# Patient Record
Sex: FEMALE | Race: WHITE | NOT HISPANIC OR LATINO | Employment: FULL TIME | URBAN - METROPOLITAN AREA
[De-identification: names, ages, dates, MRNs, and addresses within clinical notes are randomized per-mention and may not be internally consistent; named-entity substitution may affect disease eponyms.]

---

## 2023-03-06 ENCOUNTER — OFFICE VISIT (OUTPATIENT)
Dept: FAMILY MEDICINE | Facility: CLINIC | Age: 23
End: 2023-03-06
Payer: COMMERCIAL

## 2023-03-06 VITALS
WEIGHT: 170 LBS | SYSTOLIC BLOOD PRESSURE: 121 MMHG | TEMPERATURE: 98.5 F | BODY MASS INDEX: 28.29 KG/M2 | HEART RATE: 76 BPM | OXYGEN SATURATION: 98 % | DIASTOLIC BLOOD PRESSURE: 74 MMHG

## 2023-03-06 DIAGNOSIS — H92.01 RIGHT EAR PAIN: Primary | ICD-10-CM

## 2023-03-06 PROCEDURE — 99203 OFFICE O/P NEW LOW 30 MIN: CPT | Performed by: FAMILY MEDICINE

## 2023-03-06 RX ORDER — VITAMIN A ACETATE, BETA CAROTENE, ASCORBIC ACID, CHOLECALCIFEROL, .ALPHA.-TOCOPHEROL ACETATE, DL-, THIAMINE MONONITRATE, RIBOFLAVIN, NIACINAMIDE, PYRIDOXINE HYDROCHLORIDE, FOLIC ACID, CYANOCOBALAMIN, CALCIUM CARBONATE, FERROUS FUMARATE, ZINC OXIDE, CUPRIC OXIDE 3080; 12; 120; 400; 1; 1.84; 3; 20; 22; 920; 25; 200; 27; 10; 2 [IU]/1; UG/1; MG/1; [IU]/1; MG/1; MG/1; MG/1; MG/1; MG/1; [IU]/1; MG/1; MG/1; MG/1; MG/1; MG/1
1 TABLET, FILM COATED ORAL DAILY
COMMUNITY

## 2023-03-06 RX ORDER — MUPIROCIN 20 MG/G
OINTMENT TOPICAL 3 TIMES DAILY
Qty: 22 G | Refills: 0 | Status: SHIPPED | OUTPATIENT
Start: 2023-03-06 | End: 2023-03-11

## 2023-03-07 NOTE — PROGRESS NOTES
OUTPATIENT VISIT NOTE                                                   Date of Visit: 3/6/2023     Chief Complaint   Patient presents with:  Otalgia: Right side, ear drainage, pain and pressure. X 2 days             History of Present Illness   Sachin Cole is a 23 year old female pregnant--due in October--with  c/o right ear pain for two days with drainage.  No temp taken.  Some chills.  No recent cold symptoms  Last dose of antipyretic last night.  No recent ear infections.         MEDICATIONS   Current Outpatient Medications   Medication     mupirocin (BACTROBAN) 2 % external ointment     Prenatal Vit-Fe Fumarate-FA (PRENATAL PLUS) 27-1 MG TABS     No current facility-administered medications for this visit.         SOCIAL HISTORY   Social History     Tobacco Use     Smoking status: Not on file     Smokeless tobacco: Not on file   Substance Use Topics     Alcohol use: Not on file           Physical Exam   Vitals:    03/06/23 1938   BP: 121/74   Pulse: 76   Temp: 98.5  F (36.9  C)   TempSrc: Oral   SpO2: 98%   Weight: 77.1 kg (170 lb)        GENERAL:   Alert. Oriented.  EYES: Clear  HENT:  Ears: R TM pearly gray. Normal landmarks. L TM pearly gray.  Normal landmarks  Canals normal.  Tenderness over tragus  Nose: Clear.  Sinuses: Nontender.  Oropharynx:  No erythema. No exudate.  TMJ: mildly tender  NECK: Supple. No adenopathy.  LUNGS: Clear to ascultation.  No crackles.  No wheezing  HEART: RRR  SKIN:  No rash.            Assessment and Plan     Right ear pain  Pain over tragus.  TMs and canals look ok.  May have some inflammation or mild cellulitis of tragus.  May have some inflammation of jaw join.t  Can try the bactoban.  Ice the area several times a dya.  Tylenol for pain.  - mupirocin (BACTROBAN) 2 % external ointment  Dispense: 22 g; Refill: 0                   Discussed signs / symptoms that warrant urgent / emergent medical attention.     Recheck if worsening or not improving.       Solveig  MD Nikita          Pertinent History     The following portions of the patient's history were reviewed and updated as appropriate: allergies, current medications, past family history, past medical history, past social history, past surgical history and problem list.

## 2023-09-23 ENCOUNTER — HOSPITAL ENCOUNTER (OUTPATIENT)
Facility: HOSPITAL | Age: 23
Discharge: HOME | End: 2023-09-23
Attending: FAMILY MEDICINE | Admitting: FAMILY MEDICINE
Payer: COMMERCIAL

## 2023-09-23 PROCEDURE — 99213 OFFICE O/P EST LOW 20 MIN: CPT | Mod: GC | Performed by: FAMILY MEDICINE

## 2023-09-23 RX ORDER — ATOMOXETINE 10 MG/1
10 CAPSULE ORAL DAILY
COMMUNITY

## 2023-09-23 ASSESSMENT — ACTIVITIES OF DAILY LIVING (ADL): ADLS_ACUITY_SCORE: 31

## 2023-09-23 NOTE — DISCHARGE INSTRUCTIONS
"  Any Day Now  Your guide to early labor at home  Congratulations; you're in early labor! This is an early stage of labor that helps prepare your body for active labor--the phase when you finally meet your baby.   About two-thirds of your entire labor (about 66%) will be in this early stage of labor. If this is your first time in labor, this phase may last 20 hours or more. It may be shorter for people who have been in labor before.   What should I do?  We understand that you have been waiting a long time to meet your baby and that waiting a bit more seems like forever. We suggest spending your early labor at home. Here's why:  You can be comfortable in your own surroundings.  You can relax, which will help your labor progress.  It may make the time seem to go by faster.  Together, we will create a plan for when to come to the hospital or follow-up with your health care provider.  We realize that this can be a time of uncertainty, anxiety, and mixed emotions (on top of not being very restful). We hope the suggestions in this document will make your early labor a bit more comfortable and may even help speed up the process.  What should I know about early labor?  Early labor is the first stage of labor. In this stage, your uterus begins having contractions to prepare for childbirth. When you have a contraction, the muscles of the uterus tighten and relax. Contractions help your cervix to thin and shorten (efface) and open (dilate) so your baby can be born.   Labor contractions increase steadily over time. They become stronger and more frequent until they are happening every 5 minutes or more.  You may have already had some \"practice\" contractions called [place]. While these contractions may be strong and sometimes uncomfortable, they are not true labor contractions. [place] Singh contractions don't help your cervix dilate the way that labor contractions do  Words you'll hear  Cervix - the opening to the uterus. " The cervix must be fully dilated (open) for your baby to be born. The cervix is in the back of the vagina.  Contractions - when the muscles of the uterus tighten and relax.   Dilated -the openness of your cervix; it is measured in centimeters from 0 to 10.  Effaced - shortening and thinning of the cervix; it is measured from 0 to 100%.   Uterus - where your baby is located.   How does labor typcally progress?  The amount of dilation in your cervix lets us know how close you are to delivery. Labor often progresses like this:  0 centimeters: The cervix is closed.  1 to 5 centimeters: You start having regular contractions to dilate your cervix (early labor). This slowly prepares your body for childbirth.  6 centimeters: This is when active labor begins. Contractions happen in a regular pattern that increases steadily over time.  10 centimeters: Your cervix is completely dilated. You are ready to start pushing.     What can I do to help my labor progress?  Diet  It is important to stay hydrated, especially with water, broths, ice pops (Popsicles).  Eat light snacks or meals you find comforting.  As your body transitions into active labor, you will likely be less hungry. You may also feel nauseous (sick to your stomach). Eat light meals that you feel you can tolerate.  Keep your energy up with good nutrition, including fruits and vegetables.  Activity  It is important to rest as your body allows. Talk to your health care team if you find it hard to sleep or rest.  You should continue to feel your baby move. You can expect to feel 10 movements each hour.  If your water breaks, avoid sexual intercourse or putting anything into your vagina.  Positions to try  Changing your position often may help your labor to progress and feel more comfortable.  Abdominal tilt     Tilt your hips forward. Use your hands to gently lift your belly (or ask a support person). By lifting your belly and tilting your hips forward, you are creating  a straighter line for your baby to come down into your pelvis.   Side-lying resting     Lie on your side and pull your top leg up and over to a 90-degree angle, if possible. This allows a comfortable position for you to rest and your pelvis to open up. Try this position on your left side, and then your right side.   Exercise/birthing ball     A large exercise ball gives you a break from standing, plus it allows movement and lets gravity do its job. Rocking or swaying on the ball allows you to be upright so your baby can come down into your pelvis.   Hands and knees  You can also try positioning yourself on your hands and knees, with or without the ball for support.     How can my support person help me?  Talk to your support person ahead of time about ways to help you during labor. For example, your support person can:  Review different positions and comfort measures with you ahead of time. This will help you feel more prepared.  Make you healthy snacks ahead of time so you can keep your energy up.  Encourage you through breathing and relaxation techniques. This will help you stay focused.  Finding comfort during labor  Some ideas to help refocus, calm anxiety, and relax tense muscles:  Listen to soft music, watch a movie, or visit social medial (such as [employer]).  Use meditation or guided imagery to create pictures or stories in your mind.  Walk around.  Take a warm bath or shower.  Try rhythmic movement, like slow dancing or using a rocking chair.  Try massaging touch to your comfort level on the hips, lower back or feet. You can also try different pressure levels (firm pressure is safe) or vibration.  Deep breathing (may be called the 4-7-8 technique, square breathing, or relaxing breath).  Aromatherapy (peppermint, lavender and citrus are great choices for nausea and relaxation).  Wear a belly support band.  Wrap a hot/cold pack in a towel to protect your skin. Apply cold packs to your lower back or pulse  points. Or, use heat on your lower back. Leave the hot/cold pack on for 30 minutes or less.  For helpful videos on comfort during labor, please visit [email or web address]/[email or web address]-pain-management-series.  How will I know when I'm in active labor?  Start timing your contractions when they become stronger or more intense. Time your contractions from the start of one contraction until the start of another.  Signs of active labor  If this is your first baby:  Your contractions are 5 minutes apart; and  Last more than 1 minute; and  Have been consistently getting stronger   for 1 hour or more.  If this is your second baby or beyond:  Your contractions are less than 10 minutes apart; and  Have been consistently getting stronger   for 1 hour or more.  When to call your provider:  Call your provider right away if you have any of these issues:  You have any signs of active labor previously listed.  Bright red bleeding in your underwear.  You think your water has broken.  Your temperature 100.4 F (38 C) or higher.  If you are less than 34 weeks:  More than 6 contractions in one hour  Follow-up visits  Please keep your regularly scheduled clinic appointment with your pregnancy provider.  For informational purposes only. Not to replace the advice of your health care provider. Cervical effacement and dilation image ID [id number]   [employer]. All rights reserved. Text and other images copyright   2022 [employer] [facility]. All rights reserved. Clinically reviewed by the Safe Vaginal Birth Steering Team. [place] [id number] - 03/23.    Discharge Instruction for Undelivered Patients      You were seen for: Labor Assessment  We Consulted: Dr Murphy  You had (Test or Medicine):Continuous fetal monitoring, cervical exam 1/40/-3     Diet:   Drink 8 to 12 glasses of liquids (milk, juice, water) every day.  You may eat meals and snacks.     Activity:  Count fetal kicks everyday (see handout)     Call  your provider if you notice:  Swelling in your face or increased swelling in your hands or legs.  Headaches that are not relieved by Tylenol (acetaminophen).  Changes in your vision (blurring: seeing spots or stars.)  Nausea (sick to your stomach) and vomiting (throwing up).   Weight gain of 5 pounds or more per week.  Heartburn that doesn't go away.  Signs of bladder infection: pain when you urinate (use the toilet), need to go more often and more urgently.  The bag of wasserman (rupture of membranes) breaks, or you notice leaking in your underwear.  Bright red blood in your underwear.  Abdominal (lower belly) or stomach pain.  For first baby: Contractions (tightening) less than 5 minutes apart for one hour or more.  Second (plus) baby: Contractions (tightening) less than 10 minutes apart and getting stronger.  *If less than 34 weeks: Contractions (tightening) more than 6 times in one hour.  Increase or change in vaginal discharge (note the color and amount)      Follow-up:  As scheduled in the clinic

## 2023-09-23 NOTE — PROGRESS NOTES
Data: Patient assessed in the [facility] for uterine contractions. Cervix  1 cm dilated and  40 effaced. Fetal station -3 . Membranes intact. Contractions are  , 9 minutes apart, and last 40-80 seconds. Uterine assessment is   during contractions and   at rest. See flowsheets for fetal assessment documentation.     Action:  Presumed adequate fetal oxygenation documented. Early labor precautions and discharge instructions reviewed, including when to notify provider if warning signs present. Patient instructed to report decrease in fetal movement, vaginal bleeding, changes in membrane status, abdominal pain, or any concerns related to the pregnancy to patient's provider/clinic.     Response: Orders to discharge home per Dr Murphy. Plan for patient is to re-evaluate labor status by following up with provider . Patient verbalized understanding of education and agreement with plan. Discharged to home at 1330.

## 2023-09-23 NOTE — PROGRESS NOTES
Data: Patient presented to [facility]: 2023 12:07 PM.  Reason for maternal/fetal assessment is uterine contractions. Patient reports contractions started 20 min ago and are coming every 2 minutes. Patient denies leaking of vaginal fluid/rupture of membranes, vaginal bleeding, abdominal pain, pelvic pressure, nausea, vomiting, headache, visual disturbances, epigastric or RUQ pain, significant edema. Patient reports fetal movement is normal. Patient is a 36w4d . Prenatal record reviewed. Pregnancy has been uncomplicated. Support person is present.     Fetal HR baseline was  , variability is  . Accelerations:  . Decelerations:  . Uterine assessment is   during contractions and   at rest. Cervix  1 cm dilated and 40%  effaced. Fetal station  -3. Fetal presentation   per [place]. Membranes: intact.    Vital signs wnl. Patient reports pain and is coping.     Action: Verbal consent for EFM. Triage assessment completed. Patient may meet criteria for early labor discharge.     Response: Patient verbalized understanding of triage assessment. Will contact Resident doctor with assessment and consideration of early labor discharge vs admission.

## 2023-09-23 NOTE — PROGRESS NOTES
"OBSTETRICS TRIAGE ASSESSMENT NOTE  Sachin Cole is a 23 year old  at 36w4d gestation based on LMP who has presented to maternity care for further evaluation of ctx every q2min starting about 20 min prior to presentation. No bleeding, leakage of fluids, urinary symptoms, N/V, HA, visual disturbances, RUQ pain. Baby is moving normally.     PRENATAL CARE  Seen by Dr. Gun[facility] at [facility].   Uncomplicated course.        PAST MEDICAL HISTORY  Past Medical History:   Diagnosis Date    Depressive disorder        PAST SURGICAL HISTORY   Past Surgical History:   Procedure Laterality Date    WISDOM TOOTH EXTRACTION Bilateral 2016       MEDICATIONS  No current facility-administered medications for this encounter.    SOCIAL HISTORY:   Social History     Socioeconomic History    Marital status:      Spouse name: Not on file    Number of children: Not on file    Years of education: Not on file    Highest education level: Not on file   Occupational History    Not on file   Tobacco Use    Smoking status: Never    Smokeless tobacco: Never   Substance and Sexual Activity    Alcohol use: Not Currently    Drug use: Never    Sexual activity: Yes     Partners: Male     Birth control/protection: Abstinence   Other Topics Concern    Not on file   Social History Narrative    Not on file     Social Determinants of Health     Financial Resource Strain: Not on file   Food Insecurity: Not on file   Transportation Needs: Not on file   Physical Activity: Not on file   Stress: Not on file   Social Connections: Not on file   Interpersonal Safety: Not on file   Housing Stability: Not on file       PHYSICAL EXAMINATION   BP (P) 132/77   Pulse (P) 85   Temp (P) 98.3  F (36.8  C) (Oral)   Resp (P) 20   Ht (P) 1.676 m (5' 6\")   Wt (P) 89.4 kg (197 lb)   BMI (P) 31.80 kg/m    Gen: appears comfortable in no acute distress  HEENT: EOMi. Conjunctiva and sclera normal. MMM.  CV: regular rate and rhythm without murmur  Lungs: " clear to ausculation, good air movement throughout  Abdomen: Gravid, non-tender.  Cervix:  1cm/40%/-3 (per RN)   Extremities: no lower extremity edema    FETAL HEART MONITORING   Category I strip    CONTRACTIONS  3-4 every ~9 minutes thus far    LAB RESULTS  Personally reviewed.  No results found for this or any previous visit (from the past 24 hour(s)).    ASSESSMENT/PLAN:   23 year old  at 36w4d gestation presenting to labor & delivery for contractions.  No concerning symptoms.   Category I FHT. Irregular contractions on external toco.   Cervical exam unchanged from SHADI last week.     Early labor: OK to discharge to home.      Options for treatment and follow-up care were reviewed with the patient and/or guardian. Pt and/or guardian engaged in the decision making process and verbalized understanding of the options discussed and agreed with the final plan.    Precepted today with: MD Zachary Archer III, MD, PGY2  [facility]   Pgr: [phone]

## 2023-10-02 ENCOUNTER — HOSPITAL ENCOUNTER (OUTPATIENT)
Facility: HOSPITAL | Age: 23
Discharge: HOME | End: 2023-10-02
Attending: FAMILY MEDICINE | Admitting: FAMILY MEDICINE
Payer: COMMERCIAL

## 2023-10-02 ENCOUNTER — HOSPITAL ENCOUNTER (OUTPATIENT)
Facility: HOSPITAL | Age: 23
End: 2023-10-02
Admitting: FAMILY MEDICINE
Payer: COMMERCIAL

## 2023-10-02 VITALS
SYSTOLIC BLOOD PRESSURE: 125 MMHG | DIASTOLIC BLOOD PRESSURE: 69 MMHG | TEMPERATURE: 98.3 F | BODY MASS INDEX: 32.95 KG/M2 | HEART RATE: 81 BPM | WEIGHT: 205 LBS | RESPIRATION RATE: 16 BRPM | HEIGHT: 66 IN

## 2023-10-02 DIAGNOSIS — Z36.89 ENCOUNTER FOR TRIAGE IN PREGNANT PATIENT: Primary | ICD-10-CM

## 2023-10-02 PROCEDURE — G0463 HOSPITAL OUTPT CLINIC VISIT: HCPCS

## 2023-10-02 PROCEDURE — 250N000013 HC RX MED GY IP 250 OP 250 PS 637

## 2023-10-02 RX ORDER — LIDOCAINE 40 MG/G
CREAM TOPICAL
Status: DISCONTINUED | OUTPATIENT
Start: 2023-10-02 | End: 2023-10-02 | Stop reason: HOSPADM

## 2023-10-02 RX ORDER — HYDROXYZINE PAMOATE 25 MG/1
25 CAPSULE ORAL 3 TIMES DAILY PRN
Qty: 15 CAPSULE | Refills: 0 | Status: SHIPPED | OUTPATIENT
Start: 2023-10-02

## 2023-10-02 RX ORDER — HYDROXYZINE HYDROCHLORIDE 25 MG/1
25 TABLET, FILM COATED ORAL ONCE
Status: COMPLETED | OUTPATIENT
Start: 2023-10-02 | End: 2023-10-02

## 2023-10-02 RX ADMIN — HYDROXYZINE HYDROCHLORIDE 25 MG: 25 TABLET, FILM COATED ORAL at 21:10

## 2023-10-02 ASSESSMENT — ACTIVITIES OF DAILY LIVING (ADL)
ADLS_ACUITY_SCORE: 18
WEAR_GLASSES_OR_BLIND: NO
WALKING_OR_CLIMBING_STAIRS_DIFFICULTY: NO
CHANGE_IN_FUNCTIONAL_STATUS_SINCE_ONSET_OF_CURRENT_ILLNESS/INJURY: NO
DOING_ERRANDS_INDEPENDENTLY_DIFFICULTY: NO
DRESSING/BATHING_DIFFICULTY: NO
FALL_HISTORY_WITHIN_LAST_SIX_MONTHS: NO
CONCENTRATING,_REMEMBERING_OR_MAKING_DECISIONS_DIFFICULTY: NO
DIFFICULTY_EATING/SWALLOWING: NO
TOILETING_ISSUES: NO

## 2023-10-03 NOTE — PROGRESS NOTES
OBSTETRICS TRIAGE ASSESSMENT NOTE    Sachin Cole is a 23 year old  at 37w6d gestation based on LMP who has presented to maternity care for further evaluation of contractions at home. No bleeding, leakage of fluids, contractions. Baby is moving normally. No contractions on the monitor here, pt no longer feeling these.    [place] recently? No    PRENATAL CARE  Seen by Dr. Gunter at [facility].         PAST MEDICAL HISTORY  Past Medical History:   Diagnosis Date    Depressive disorder      PAST SURGICAL HISTORY   Past Surgical History:   Procedure Laterality Date    WISDOM TOOTH EXTRACTION Bilateral 2016     MEDICATIONS    Current Facility-Administered Medications:     hydrOXYzine (ATARAX) tablet 25 mg, 25 mg, Oral, Once, Be Mary MD    lidocaine (LMX4) cream, , Topical, Q1H PRN, Jessica Dunaway MD    lidocaine 1 % 0.1-1 mL, 0.1-1 mL, Other, Q1H PRN, Jessica Dunaway MD    sodium chloride (PF) 0.9% PF flush 3 mL, 3 mL, Intracatheter, Q8H, Jessica Dunaway MD    sodium chloride (PF) 0.9% PF flush 3 mL, 3 mL, Intracatheter, q1 min prn, Jessica Dunaway MD    SOCIAL HISTORY:   Social History     Socioeconomic History    Marital status:      Spouse name: Not on file    Number of children: Not on file    Years of education: Not on file    Highest education level: Not on file   Occupational History    Not on file   Tobacco Use    Smoking status: Never    Smokeless tobacco: Never   Substance and Sexual Activity    Alcohol use: Not Currently    Drug use: Never    Sexual activity: Yes     Partners: Male     Birth control/protection: Abstinence   Other Topics Concern    Not on file   Social History Narrative    Not on file     Social Determinants of Health     Financial Resource Strain: Not on file   Food Insecurity: Not on file   Transportation Needs: Not on file   Physical Activity: Not on file   Stress: Not on file   Social Connections: Not on file   Interpersonal  "Safety: Not on file   Housing Stability: Not on file     PHYSICAL EXAMINATION   /69 (BP Location: Right arm, Patient Position: Semi-Atkinson's, Cuff Size: Adult Regular)   Pulse 81   Temp 98.3  F (36.8  C) (Oral)   Resp 16   Ht 1.676 m (5' 6\")   Wt 93 kg (205 lb)   BMI 33.09 kg/m    Gen: appears comfortable, no acute distress  CV: regular rate and rhythm, no murmur appreciated  Lungs: clear to ausculation, good air movement throughout  Abdomen: Gravid, non-tender fundus  Extremities: no lower extremity edema  Cervix: 1/50%/0   Membranes: are intact  FHT: Category 1 tracing; baseline 150 with moderate variability and accelerations, no decelerations  Contractions: none    LAB RESULTS  Personally reviewed.  No results found for this or any previous visit (from the past 24 hour(s)).    ASSESSMENT:  Sachin Cole is a 23 year old year old at 37w6d weeks, not currently in active labor, presenting with abdominal discomfort consistent with likely [place]-Singh contractions. Had been feeling contractions 5 min apart for over an hour earlier today, these have since subsided and no contractions seen on the monitor. Consistent category 1 FHT here. Will give one time dose of Atarax and send home w/ small script for this.    PLAN:  Discussed return precautions for signs of labor  1x dose of Vistaril here, will also send small prescription for more    Be Mary MD  [facility]     Precepted patient with Dr. Tam Bishop who agrees with the plan above.    "

## 2023-10-03 NOTE — PROGRESS NOTES
- 24 yo, , 38+6 WGA arrived to [facility] to r/o labor.  Unrmarkable prenatal history. States she has been garcia q 2 min since 1700.  Pos FM, denies LOF.  Assessment complete and WNL.  Cat 1 FHR tracing.  Uctx occasional, unable to trace, mild to abd palpation. SVE 1-2/50%/0/soft/posterior.  FOB at bedside for support.  Oriented to labor room and initial POC.  All questions and concerns addressed.